# Patient Record
Sex: FEMALE | Race: BLACK OR AFRICAN AMERICAN | Employment: UNEMPLOYED | URBAN - METROPOLITAN AREA
[De-identification: names, ages, dates, MRNs, and addresses within clinical notes are randomized per-mention and may not be internally consistent; named-entity substitution may affect disease eponyms.]

---

## 2019-05-12 ENCOUNTER — HOSPITAL ENCOUNTER (EMERGENCY)
Age: 54
Discharge: HOME | End: 2019-05-12
Attending: EMERGENCY MEDICINE
Payer: MEDICARE

## 2019-05-12 ENCOUNTER — APPOINTMENT (OUTPATIENT)
Dept: GENERAL RADIOLOGY | Age: 54
End: 2019-05-12
Attending: EMERGENCY MEDICINE
Payer: MEDICARE

## 2019-05-12 VITALS
OXYGEN SATURATION: 93 % | TEMPERATURE: 98.5 F | BODY MASS INDEX: 37.77 KG/M2 | WEIGHT: 235 LBS | DIASTOLIC BLOOD PRESSURE: 74 MMHG | SYSTOLIC BLOOD PRESSURE: 112 MMHG | HEART RATE: 52 BPM | RESPIRATION RATE: 14 BRPM | HEIGHT: 66 IN

## 2019-05-12 DIAGNOSIS — N30.00 ACUTE CYSTITIS WITHOUT HEMATURIA: Primary | ICD-10-CM

## 2019-05-12 DIAGNOSIS — S29.012A STRAIN OF THORACIC BACK REGION: ICD-10-CM

## 2019-05-12 LAB
ALBUMIN SERPL-MCNC: 4.3 G/DL (ref 3.4–5)
ALBUMIN/GLOB SERPL: 1.1 {RATIO} (ref 0.8–1.7)
ALP SERPL-CCNC: 87 U/L (ref 45–117)
ALT SERPL-CCNC: 25 U/L (ref 13–56)
ANION GAP SERPL CALC-SCNC: 10 MMOL/L (ref 3–18)
APPEARANCE UR: CLEAR
AST SERPL-CCNC: 28 U/L (ref 15–37)
BACTERIA URNS QL MICRO: ABNORMAL /HPF
BASOPHILS # BLD: 0 K/UL (ref 0–0.1)
BASOPHILS NFR BLD: 0 % (ref 0–2)
BILIRUB SERPL-MCNC: 0.5 MG/DL (ref 0.2–1)
BILIRUB UR QL: NEGATIVE
BUN SERPL-MCNC: 12 MG/DL (ref 7–18)
BUN/CREAT SERPL: 15 (ref 12–20)
CALCIUM SERPL-MCNC: 8.9 MG/DL (ref 8.5–10.1)
CHLORIDE SERPL-SCNC: 101 MMOL/L (ref 100–108)
CK MB CFR SERPL CALC: 0.3 % (ref 0–4)
CK MB SERPL-MCNC: 2.3 NG/ML (ref 5–25)
CK SERPL-CCNC: 729 U/L (ref 26–192)
CO2 SERPL-SCNC: 27 MMOL/L (ref 21–32)
COLOR UR: YELLOW
CREAT SERPL-MCNC: 0.8 MG/DL (ref 0.6–1.3)
DIFFERENTIAL METHOD BLD: NORMAL
EOSINOPHIL # BLD: 0.4 K/UL (ref 0–0.4)
EOSINOPHIL NFR BLD: 4 % (ref 0–5)
EPITH CASTS URNS QL MICRO: ABNORMAL /LPF (ref 0–5)
ERYTHROCYTE [DISTWIDTH] IN BLOOD BY AUTOMATED COUNT: 13.8 % (ref 11.6–14.5)
GLOBULIN SER CALC-MCNC: 3.9 G/DL (ref 2–4)
GLUCOSE SERPL-MCNC: 84 MG/DL (ref 74–99)
GLUCOSE UR STRIP.AUTO-MCNC: NEGATIVE MG/DL
HCT VFR BLD AUTO: 39.2 % (ref 35–45)
HGB BLD-MCNC: 12.7 G/DL (ref 12–16)
HGB UR QL STRIP: ABNORMAL
KETONES UR QL STRIP.AUTO: NEGATIVE MG/DL
LEUKOCYTE ESTERASE UR QL STRIP.AUTO: ABNORMAL
LYMPHOCYTES # BLD: 3.2 K/UL (ref 0.9–3.6)
LYMPHOCYTES NFR BLD: 36 % (ref 21–52)
MAGNESIUM SERPL-MCNC: 2.1 MG/DL (ref 1.6–2.6)
MCH RBC QN AUTO: 26.3 PG (ref 24–34)
MCHC RBC AUTO-ENTMCNC: 32.4 G/DL (ref 31–37)
MCV RBC AUTO: 81.3 FL (ref 74–97)
MONOCYTES # BLD: 0.7 K/UL (ref 0.05–1.2)
MONOCYTES NFR BLD: 8 % (ref 3–10)
NEUTS SEG # BLD: 4.7 K/UL (ref 1.8–8)
NEUTS SEG NFR BLD: 52 % (ref 40–73)
NITRITE UR QL STRIP.AUTO: NEGATIVE
PH UR STRIP: 6.5 [PH] (ref 5–8)
PLATELET # BLD AUTO: 275 K/UL (ref 135–420)
PMV BLD AUTO: 10.1 FL (ref 9.2–11.8)
POTASSIUM SERPL-SCNC: 3.9 MMOL/L (ref 3.5–5.5)
PROT SERPL-MCNC: 8.2 G/DL (ref 6.4–8.2)
PROT UR STRIP-MCNC: NEGATIVE MG/DL
RBC # BLD AUTO: 4.82 M/UL (ref 4.2–5.3)
RBC #/AREA URNS HPF: ABNORMAL /HPF (ref 0–5)
SODIUM SERPL-SCNC: 138 MMOL/L (ref 136–145)
SP GR UR REFRACTOMETRY: 1.01 (ref 1–1.03)
TROPONIN I SERPL-MCNC: <0.02 NG/ML (ref 0–0.04)
UROBILINOGEN UR QL STRIP.AUTO: 1 EU/DL (ref 0.2–1)
WBC # BLD AUTO: 9 K/UL (ref 4.6–13.2)
WBC URNS QL MICRO: ABNORMAL /HPF (ref 0–5)

## 2019-05-12 PROCEDURE — 99285 EMERGENCY DEPT VISIT HI MDM: CPT

## 2019-05-12 PROCEDURE — 71045 X-RAY EXAM CHEST 1 VIEW: CPT

## 2019-05-12 PROCEDURE — 81001 URINALYSIS AUTO W/SCOPE: CPT

## 2019-05-12 PROCEDURE — 82550 ASSAY OF CK (CPK): CPT

## 2019-05-12 PROCEDURE — 85025 COMPLETE CBC W/AUTO DIFF WBC: CPT

## 2019-05-12 PROCEDURE — 83735 ASSAY OF MAGNESIUM: CPT

## 2019-05-12 PROCEDURE — 80053 COMPREHEN METABOLIC PANEL: CPT

## 2019-05-12 PROCEDURE — 93005 ELECTROCARDIOGRAM TRACING: CPT

## 2019-05-12 PROCEDURE — 74011250637 HC RX REV CODE- 250/637: Performed by: EMERGENCY MEDICINE

## 2019-05-12 RX ORDER — NAPROXEN SODIUM 220 MG
220 TABLET ORAL
COMMUNITY
Start: 2019-03-18

## 2019-05-12 RX ORDER — OMEPRAZOLE 10 MG/1
1 CAPSULE, DELAYED RELEASE ORAL
COMMUNITY
Start: 2018-06-08

## 2019-05-12 RX ORDER — CEPHALEXIN 500 MG/1
500 CAPSULE ORAL 2 TIMES DAILY
Qty: 14 CAP | Refills: 0 | Status: SHIPPED | OUTPATIENT
Start: 2019-05-12 | End: 2019-05-19

## 2019-05-12 RX ORDER — ATENOLOL 25 MG/1
25 TABLET ORAL
COMMUNITY
Start: 2013-08-29

## 2019-05-12 RX ORDER — METHOCARBAMOL 750 MG/1
750 TABLET, FILM COATED ORAL 4 TIMES DAILY
Qty: 24 TAB | Refills: 0 | Status: SHIPPED | OUTPATIENT
Start: 2019-05-12

## 2019-05-12 RX ORDER — METHIMAZOLE 5 MG/1
5 TABLET ORAL
COMMUNITY
Start: 2016-02-02

## 2019-05-12 RX ORDER — CHLORTHALIDONE 25 MG/1
25 TABLET ORAL
COMMUNITY
Start: 2019-03-18

## 2019-05-12 RX ORDER — GUAIFENESIN 100 MG/5ML
162 LIQUID (ML) ORAL
Status: COMPLETED | OUTPATIENT
Start: 2019-05-12 | End: 2019-05-12

## 2019-05-12 RX ADMIN — ASPIRIN 81 MG 162 MG: 81 TABLET ORAL at 10:59

## 2019-05-12 NOTE — ED NOTES
Pt discharged with daughters, no acute distress on discharge, written isnt given to pt and daughters with rx x 2 e faxed to pharmacy, verbalizes understanding Patient armband removed and shredded

## 2019-05-12 NOTE — ED NOTES
Pt resting on stretcher, call bell within reach, side rails up, daughter at bedside, no needs expressed at this time

## 2019-05-12 NOTE — ED TRIAGE NOTES
Patient with complaints of upper back and neck pain that started last night. Patient states that she started to have radiation to her chest PTA

## 2019-05-12 NOTE — DISCHARGE INSTRUCTIONS
Patient Education        Urinary Tract Infection in Women: Care Instructions  Your Care Instructions    A urinary tract infection, or UTI, is a general term for an infection anywhere between the kidneys and the urethra (where urine comes out). Most UTIs are bladder infections. They often cause pain or burning when you urinate. UTIs are caused by bacteria and can be cured with antibiotics. Be sure to complete your treatment so that the infection goes away. Follow-up care is a key part of your treatment and safety. Be sure to make and go to all appointments, and call your doctor if you are having problems. It's also a good idea to know your test results and keep a list of the medicines you take. How can you care for yourself at home? · Take your antibiotics as directed. Do not stop taking them just because you feel better. You need to take the full course of antibiotics. · Drink extra water and other fluids for the next day or two. This may help wash out the bacteria that are causing the infection. (If you have kidney, heart, or liver disease and have to limit fluids, talk with your doctor before you increase your fluid intake.)  · Avoid drinks that are carbonated or have caffeine. They can irritate the bladder. · Urinate often. Try to empty your bladder each time. · To relieve pain, take a hot bath or lay a heating pad set on low over your lower belly or genital area. Never go to sleep with a heating pad in place. To prevent UTIs  · Drink plenty of water each day. This helps you urinate often, which clears bacteria from your system. (If you have kidney, heart, or liver disease and have to limit fluids, talk with your doctor before you increase your fluid intake.)  · Urinate when you need to. · Urinate right after you have sex. · Change sanitary pads often. · Avoid douches, bubble baths, feminine hygiene sprays, and other feminine hygiene products that have deodorants.   · After going to the bathroom, wipe from front to back. When should you call for help? Call your doctor now or seek immediate medical care if:    · Symptoms such as fever, chills, nausea, or vomiting get worse or appear for the first time.     · You have new pain in your back just below your rib cage. This is called flank pain.     · There is new blood or pus in your urine.     · You have any problems with your antibiotic medicine.    Watch closely for changes in your health, and be sure to contact your doctor if:    · You are not getting better after taking an antibiotic for 2 days.     · Your symptoms go away but then come back. Where can you learn more? Go to http://denita-leigh.info/. Enter D070 in the search box to learn more about \"Urinary Tract Infection in Women: Care Instructions. \"  Current as of: March 20, 2018  Content Version: 11.9  © 3060-4748 [employer]. Care instructions adapted under license by [employer] (which disclaims liability or warranty for this information). If you have questions about a medical condition or this instruction, always ask your healthcare professional. [place] [address] any warranty or liability for your use of this information. Patient Education        Back Pain: Care Instructions  Your Care Instructions    Back pain has many possible causes. It is often related to problems with muscles and ligaments of the back. It may also be related to problems with the nerves, discs, or bones of the back. Moving, lifting, standing, sitting, or sleeping in an awkward way can strain the back. Sometimes you don't notice the injury until later. Arthritis is another common cause of back pain. Although it may hurt a lot, back pain usually improves on its own within several weeks. Most people recover in 12 weeks or less. Using good home treatment and being careful not to stress your back can help you feel better sooner.   Follow-up care is a key part of your treatment and safety. Be sure to make and go to all appointments, and call your doctor if you are having problems. It's also a good idea to know your test results and keep a list of the medicines you take. How can you care for yourself at home? · Sit or lie in positions that are most comfortable and reduce your pain. Try one of these positions when you lie down:  ? Lie on your back with your knees bent and supported by large pillows. ? Lie on the floor with your legs on the seat of a sofa or chair. ? Lie on your side with your knees and hips bent and a pillow between your legs. ? Lie on your stomach if it does not make pain worse. · Do not sit up in bed, and avoid soft couches and twisted positions. Bed rest can help relieve pain at first, but it delays healing. Avoid bed rest after the first day of back pain. · Change positions every 30 minutes. If you must sit for long periods of time, take breaks from sitting. Get up and walk around, or lie in a comfortable position. · Try using a heating pad on a low or medium setting for 15 to 20 minutes every 2 or 3 hours. Try a warm shower in place of one session with the heating pad. · You can also try an ice pack for 10 to 15 minutes every 2 to 3 hours. Put a thin cloth between the ice pack and your skin. · Take pain medicines exactly as directed. ? If the doctor gave you a prescription medicine for pain, take it as prescribed. ? If you are not taking a prescription pain medicine, ask your doctor if you can take an over-the-counter medicine. · Take short walks several times a day. You can start with 5 to 10 minutes, 3 or 4 times a day, and work up to longer walks. Walk on level surfaces and avoid hills and stairs until your back is better. · Return to work and other activities as soon as you can. Continued rest without activity is usually not good for your back. · To prevent future back pain, do exercises to stretch and strengthen your back and stomach.  Learn how to use good posture, safe lifting techniques, and proper body mechanics. When should you call for help? Call your doctor now or seek immediate medical care if:    · You have new or worsening numbness in your legs.     · You have new or worsening weakness in your legs. (This could make it hard to stand up.)     · You lose control of your bladder or bowels.    Watch closely for changes in your health, and be sure to contact your doctor if:    · You have a fever, lose weight, or don't feel well.     · You do not get better as expected. Where can you learn more? Go to http://denita-leigh.info/. Enter V498 in the search box to learn more about \"Back Pain: Care Instructions. \"  Current as of: September 20, 2018  Content Version: 11.9  © 1972-5959 [employer]. Care instructions adapted under license by [employer] (which disclaims liability or warranty for this information). If you have questions about a medical condition or this instruction, always ask your healthcare professional. [address] any warranty or liability for your use of this information.

## 2019-05-12 NOTE — ED NOTES
Pt resting on stretcher, call bell within reach, side rails up, no needs expressed at this time, daughters remain at bedside

## 2019-05-12 NOTE — ED PROVIDER NOTES
EMERGENCY DEPARTMENT HISTORY AND PHYSICAL EXAM 
 
Date: 5/12/2019 Patient Name: Ivette Ervin History of Presenting Illness Chief Complaint Patient presents with  Back Pain  Neck Pain History Provided By: Patient History Virgilio Pallas):  
10:03 AM 
Ivette Ervin is a 53 y.o. female with PMHX of Hypertension, seizures, blindness who presents to the emergency department C/O upper back and neck pain onset last night. Patient states that she has had upper back and neck pain since last night this morning she began to experience some radiation to her chest prior to arrival.  Patient states she had been cleaning up at home last night. She denies upper back pain until she sat down to rest afterwards. Associated sxs include mild swelling at the base of the cervical spine. Pt denies shortness of breath, fever, chills, nausea, vomiting or any other sxs or complaints. Chief Complaint: Thoracic back pain Duration: One day Timing: Acute onset last night persistent today Location: Upper thoracic spine Quality: Aching Severity: Moderate Modifying Factors: Nothing makes it better, touching it makes it worse. Associated Symptoms: denies any other associated signs or symptoms PCP: Nato Cesar MD 
  
Current Outpatient Medications Medication Sig Dispense Refill  atenolol (TENORMIN) 25 mg tablet Take 25 mg by mouth.  chlorthalidone (HYGROTEN) 25 mg tablet Take 25 mg by mouth.  methIMAzole (TAPAZOLE) 5 mg tablet Take 5 mg by mouth.  omeprazole (PRILOSEC) 10 mg capsule Take 1 Cap by mouth.  naproxen sodium (NAPROSYN) 220 mg tablet Take 220 mg by mouth.  methocarbamol (ROBAXIN-750) 750 mg tablet Take 1 Tab by mouth four (4) times daily. 24 Tab 0  cephALEXin (KEFLEX) 500 mg capsule Take 1 Cap by mouth two (2) times a day for 7 days. 14 Cap 0  
 divalproex DR (DEPAKOTE) 125 mg tablet Take 500 mg by mouth three (3) times daily. Indications: EPILEPSY  traMADol (ULTRAM) 50 mg tablet Take 1 Tab by mouth every six (6) hours as needed for Pain. Max Daily Amount: 200 mg. 20 Tab 0 Past History Past Medical History: 
Past Medical History:  
Diagnosis Date  Blind  Hypertension  Seizures ([address] Past Surgical History: 
Past Surgical History:  
Procedure Laterality Date  HX DILATION AND CURETTAGE    
 HX GYN    
 kidney stone removal  
 HX HEENT    
 tonsils removed resulted in tracheostomy Family History: 
History reviewed. No pertinent family history. Social History: 
Social History Tobacco Use  Smoking status: Never Smoker  Smokeless tobacco: Never Used Substance Use Topics  Alcohol use: No  
 Drug use: Not on file Allergies: 
No Known Allergies Review of Systems Review of Systems Constitutional: Negative for chills and fever. Respiratory: Negative for shortness of breath. Cardiovascular: Positive for chest pain. Gastrointestinal: Negative for nausea and vomiting. Musculoskeletal: Positive for back pain. Physical Exam  
 
Vitals:  
 05/12/19 1030 05/12/19 1100 05/12/19 1130 05/12/19 1200 BP: 129/88 115/74 126/81 113/74 Pulse: 65 (!) 55 (!) 52 (!) 56 Resp: 16 15 14 18 Temp:      
SpO2:  100% 100% 100% Weight:      
Height:      
 
Physical Exam  
Nursing note and vitals reviewed. Vital signs and nursing notes reviewed CONSTITUTIONAL: Alert, in no apparent distress; well-developed; well-nourished. HEAD:  Normocephalic, atraumatic EYES: Baseline disconjugate gaze. ENTM: Nose: no rhinorrhea; Throat: no erythema or exudate, mucous membranes moist 
Neck:  No JVD, supple without lymphadenopathy RESP: Chest clear, equal breath sounds. CV: S1 and S2 WNL; No murmurs, gallops or rubs. GI: Normal bowel sounds, abdomen soft and non-tender. No masses or organomegaly. : No costo-vertebral angle tenderness. BACK: Mild swelling over T1, tenderness and spasm in the upper thoracic paraspinous muscles bilaterally UPPER EXT:  Normal inspection LOWER EXT: No edema, no calf tenderness. Distal pulses intact. NEURO: CN intact, reflexes 2/4 and sym, strength 5/5 and sym, sensation intact. SKIN: No rashes; Normal for age and stage. PSYCH:  Alert and oriented, normal affect. Diagnostic Study Results Labs - Recent Results (from the past 12 hour(s)) EKG, 12 LEAD, INITIAL Collection Time: 05/12/19  9:57 AM  
Result Value Ref Range Ventricular Rate 51 BPM  
 Atrial Rate 51 BPM  
 P-R Interval 154 ms QRS Duration 86 ms  
 Q-T Interval 502 ms QTC Calculation (Bezet) 462 ms Calculated P Axis 36 degrees Calculated R Axis 25 degrees Calculated T Axis 39 degrees Diagnosis Sinus bradycardia Otherwise normal ECG No previous ECGs available CBC WITH AUTOMATED DIFF Collection Time: 05/12/19 10:30 AM  
Result Value Ref Range WBC 9.0 4.6 - 13.2 K/uL  
 RBC 4.82 4.20 - 5.30 M/uL  
 HGB 12.7 12.0 - 16.0 g/dL HCT 39.2 35.0 - 45.0 % MCV 81.3 74.0 - 97.0 FL  
 MCH 26.3 24.0 - 34.0 PG  
 MCHC 32.4 31.0 - 37.0 g/dL  
 RDW 13.8 11.6 - 14.5 % PLATELET 751 822 - 996 K/uL MPV 10.1 9.2 - 11.8 FL  
 NEUTROPHILS 52 40 - 73 % LYMPHOCYTES 36 21 - 52 % MONOCYTES 8 3 - 10 % EOSINOPHILS 4 0 - 5 % BASOPHILS 0 0 - 2 %  
 ABS. NEUTROPHILS 4.7 1.8 - 8.0 K/UL  
 ABS. LYMPHOCYTES 3.2 0.9 - 3.6 K/UL  
 ABS. MONOCYTES 0.7 0.05 - 1.2 K/UL  
 ABS. EOSINOPHILS 0.4 0.0 - 0.4 K/UL  
 ABS. BASOPHILS 0.0 0.0 - 0.1 K/UL  
 DF AUTOMATED METABOLIC PANEL, COMPREHENSIVE Collection Time: 05/12/19 10:30 AM  
Result Value Ref Range Sodium 138 136 - 145 mmol/L Potassium 3.9 3.5 - 5.5 mmol/L Chloride 101 100 - 108 mmol/L  
 CO2 27 21 - 32 mmol/L Anion gap 10 3.0 - 18 mmol/L Glucose 84 74 - 99 mg/dL BUN 12 7.0 - 18 MG/DL  Creatinine 0.80 0.6 - 1.3 MG/DL  
 BUN/Creatinine ratio 15 12 - 20 GFR est AA >60 >60 ml/min/1.73m2 GFR est non-AA >60 >60 ml/min/1.73m2 Calcium 8.9 8.5 - 10.1 MG/DL Bilirubin, total 0.5 0.2 - 1.0 MG/DL  
 ALT (SGPT) 25 13 - 56 U/L  
 AST (SGOT) 28 15 - 37 U/L Alk. phosphatase 87 45 - 117 U/L Protein, total 8.2 6.4 - 8.2 g/dL Albumin 4.3 3.4 - 5.0 g/dL Globulin 3.9 2.0 - 4.0 g/dL A-G Ratio 1.1 0.8 - 1.7 MAGNESIUM Collection Time: 05/12/19 10:30 AM  
Result Value Ref Range Magnesium 2.1 1.6 - 2.6 mg/dL CARDIAC PANEL,(CK, CKMB & TROPONIN) Collection Time: 05/12/19 10:30 AM  
Result Value Ref Range  (H) 26 - 192 U/L  
 CK - MB 2.3 <3.6 ng/ml CK-MB Index 0.3 0.0 - 4.0 % Troponin-I, QT <0.02 0.0 - 0.045 NG/ML  
URINALYSIS W/ RFLX MICROSCOPIC Collection Time: 05/12/19 12:00 PM  
Result Value Ref Range Color YELLOW Appearance CLEAR Specific gravity 1.008 1.005 - 1.030    
 pH (UA) 6.5 5.0 - 8.0 Protein NEGATIVE  NEG mg/dL Glucose NEGATIVE  NEG mg/dL Ketone NEGATIVE  NEG mg/dL Bilirubin NEGATIVE  NEG Blood TRACE (A) NEG Urobilinogen 1.0 0.2 - 1.0 EU/dL Nitrites NEGATIVE  NEG Leukocyte Esterase TRACE (A) NEG URINE MICROSCOPIC ONLY Collection Time: 05/12/19 12:00 PM  
Result Value Ref Range WBC 0 to 3 0 - 5 /hpf  
 RBC NONE 0 - 5 /hpf Epithelial cells 2+ 0 - 5 /lpf Bacteria 4+ (A) NEG /hpf Radiologic Studies -  
11:53 AM 
RADIOLOGY FINDINGS Chest X-ray shows no acute cardiopulmonary process. Pending review by Radiologist 
Recorded by Lucy Gallegos MD. 
 
XR CHEST PORT Final Result IMPRESSION:  
  
1. Stable exam. No acute cardiopulmonary process. CT Results  (Last 48 hours) None CXR Results  (Last 48 hours) 05/12/19 1055  XR CHEST PORT Final result Impression:  IMPRESSION:  
   
1. Stable exam. No acute cardiopulmonary process.    
  
 Narrative:  EXAM: XR CHEST PORT  
   
 CLINICAL INDICATION/HISTORY: thoracic back pain/chest pain  
-Additional: None COMPARISON: 11/12/2015 TECHNIQUE: Frontal view of the chest  
   
_______________ FINDINGS:  
   
HEART AND MEDIASTINUM: Midline cardiac silhouette, normal in size. Unremarkable  
hilar vascular structures. LUNGS AND PLEURAL SPACES: No focal consolidation, parenchymal opacity. No  
pneumothorax or pleural effusion. BONY THORAX AND SOFT TISSUES: No acute or destructive osseous abnormality. _______________ Medications given in the ED- Medications  
aspirin chewable tablet 162 mg (162 mg Oral Given 5/12/19 1059) Medical Decision Making I am the first provider for this patient. I reviewed the vital signs, available nursing notes, past medical history, past surgical history, family history and social history. Vital Signs-Reviewed the patient's vital signs. EKG interpretation: (Preliminary) Rhythm: Sinus bradycardia. Rate: 51 bpm; no STEMI 
EKG read by Bartolo Armando MD at 9:57 AM 
 
Records Reviewed: Nursing Notes Provider Notes (Medical Decision Making): DDX: Sprain, strain, fracture, subluxation, unlikely ACS, unlikely aortic pathology Procedures: 
Procedures ED Course:  
10:03 AM Initial assessment performed. The patients presenting problems have been discussed, and they are in agreement with the care plan formulated and outlined with them. I have encouraged them to ask questions as they arise throughout their visit. Diagnosis and Disposition Discussion: 
53 y.o. female with thoracic back pain which is very consistent with a musculoskeletal injury as pain is completely and exactly reproducible on exam.  Cardiac panel was negative. Chest x-ray was negative. Patient has incidental positive UA for UTI. Will cover with Keflex. Patient to follow-up with primary care doctor. Patient agrees with plan.  
 
DISCHARGE NOTE: 
1:27 PM  
 Ofelia Webb's  results have been reviewed with her. She has been counseled regarding her diagnosis, treatment, and plan. She verbally conveys understanding and agreement of the signs, symptoms, diagnosis, treatment and prognosis and additionally agrees to follow up as discussed. She also agrees with the care-plan and conveys that all of her questions have been answered. I have also provided discharge instructions for her that include: educational information regarding their diagnosis and treatment, and list of reasons why they would want to return to the ED prior to their follow-up appointment, should her condition change. She has been provided with education for proper emergency department utilization. CLINICAL IMPRESSION: 
 
1. Acute cystitis without hematuria 2. Strain of thoracic back region PLAN: 
1. D/C Home 2. Current Discharge Medication List  
  
START taking these medications Details  
methocarbamol (ROBAXIN-750) 750 mg tablet Take 1 Tab by mouth four (4) times daily. Qty: 24 Tab, Refills: 0  
  
cephALEXin (KEFLEX) 500 mg capsule Take 1 Cap by mouth two (2) times a day for 7 days. Qty: 14 Cap, Refills: 0 CONTINUE these medications which have NOT CHANGED Details  
atenolol (TENORMIN) 25 mg tablet Take 25 mg by mouth. chlorthalidone (HYGROTEN) 25 mg tablet Take 25 mg by mouth. methIMAzole (TAPAZOLE) 5 mg tablet Take 5 mg by mouth. omeprazole (PRILOSEC) 10 mg capsule Take 1 Cap by mouth. naproxen sodium (NAPROSYN) 220 mg tablet Take 220 mg by mouth. divalproex DR (DEPAKOTE) 125 mg tablet Take 500 mg by mouth three (3) times daily. Indications: EPILEPSY  
  
traMADol (ULTRAM) 50 mg tablet Take 1 Tab by mouth every six (6) hours as needed for Pain. Max Daily Amount: 200 mg. Qty: 20 Tab, Refills: 0  
  
  
 
3. Follow-up Information Follow up With Specialties Details Why Contact Info Tariq Wen,  [facility]   [address] [place] [address] 
[phone] [facility] EMERGENCY DEPT Emergency Medicine  As needed; If symptoms worsen [address] [place] [address] [phone] Please note that this dictation was completed with [email or web address], the computer voice recognition software. Quite often unanticipated grammatical, syntax, homophones, and other interpretive errors are inadvertently transcribed by the computer software. Please disregard these errors. Please excuse any errors that have escaped final proofreading.  
 
Chioma Montgomery MD

## 2019-05-13 LAB
ATRIAL RATE: 51 BPM
CALCULATED P AXIS, ECG09: 36 DEGREES
CALCULATED R AXIS, ECG10: 25 DEGREES
CALCULATED T AXIS, ECG11: 39 DEGREES
DIAGNOSIS, 93000: NORMAL
P-R INTERVAL, ECG05: 154 MS
Q-T INTERVAL, ECG07: 502 MS
QRS DURATION, ECG06: 86 MS
QTC CALCULATION (BEZET), ECG08: 462 MS
VENTRICULAR RATE, ECG03: 51 BPM

## 2021-07-04 ENCOUNTER — HOSPITAL ENCOUNTER (EMERGENCY)
Age: 56
Discharge: HOME | End: 2021-07-05
Attending: EMERGENCY MEDICINE
Payer: MEDICARE

## 2021-07-04 ENCOUNTER — APPOINTMENT (OUTPATIENT)
Dept: CT IMAGING | Age: 56
End: 2021-07-04
Attending: EMERGENCY MEDICINE
Payer: MEDICARE

## 2021-07-04 VITALS
BODY MASS INDEX: 38.32 KG/M2 | WEIGHT: 230 LBS | OXYGEN SATURATION: 100 % | HEART RATE: 96 BPM | HEIGHT: 65 IN | DIASTOLIC BLOOD PRESSURE: 97 MMHG | RESPIRATION RATE: 18 BRPM | SYSTOLIC BLOOD PRESSURE: 157 MMHG | TEMPERATURE: 98 F

## 2021-07-04 DIAGNOSIS — S00.93XA CONTUSION OF HEAD, UNSPECIFIED PART OF HEAD, INITIAL ENCOUNTER: Primary | ICD-10-CM

## 2021-07-04 PROCEDURE — 99284 EMERGENCY DEPT VISIT MOD MDM: CPT

## 2021-07-04 PROCEDURE — 70450 CT HEAD/BRAIN W/O DYE: CPT

## 2021-07-04 PROCEDURE — 74011250637 HC RX REV CODE- 250/637: Performed by: EMERGENCY MEDICINE

## 2021-07-04 RX ORDER — KETOROLAC TROMETHAMINE 10 MG/1
10 TABLET, FILM COATED ORAL
Qty: 20 TABLET | Refills: 0 | Status: SHIPPED | OUTPATIENT
Start: 2021-07-04 | End: 2021-07-09

## 2021-07-04 RX ORDER — KETOROLAC TROMETHAMINE 10 MG/1
10 TABLET, FILM COATED ORAL
Qty: 20 TABLET | Refills: 0 | Status: SHIPPED | OUTPATIENT
Start: 2021-07-04 | End: 2021-07-04 | Stop reason: SDUPTHER

## 2021-07-04 RX ORDER — ACETAMINOPHEN 500 MG
1000 TABLET ORAL ONCE
Status: COMPLETED | OUTPATIENT
Start: 2021-07-04 | End: 2021-07-04

## 2021-07-04 RX ORDER — IBUPROFEN 400 MG/1
800 TABLET ORAL ONCE
Status: COMPLETED | OUTPATIENT
Start: 2021-07-04 | End: 2021-07-04

## 2021-07-04 RX ADMIN — ACETAMINOPHEN 1000 MG: 500 TABLET ORAL at 22:13

## 2021-07-04 RX ADMIN — IBUPROFEN 800 MG: 400 TABLET ORAL at 22:13

## 2021-07-05 NOTE — ED NOTES
Pt reports to ER w/ EMS due to fall to posterior side of head w/ no LOC occurring PTA. Pt legally blind and reports going to sit down on the toilet when she fell backwards into tub; hitting back side of head. Bump noted to posterior side of head and pt reports increasing headache. No laceration noted to head. Alert and oriented x4. Skin warm dry and intact. Ambulates w/ walker but can ambulate independently w/ assistance. Emergency Department Nursing Plan of Care       The Nursing Plan of Care is developed from the Nursing assessment and Emergency Department Attending provider initial evaluation. The plan of care may be reviewed in the ED Provider note.     The Plan of Care was developed with the following considerations:   Patient / Family readiness to learn indicated by:verbalized understanding  Persons(s) to be included in education: patient  Barriers to Learning/Limitations:No    Signed     Nancy Pepper    7/4/2021   10:29 PM

## 2021-07-05 NOTE — ED NOTES
Patient given copy of dc instructions and 1 script(s). Patient  verbalized understanding of instructions and script (s). Patient given a current medication reconciliation form and verbalized understanding of their medications. Patient  verbalized understanding of the importance of discussing medications with  his or her physician or clinic they will be following up with. Patient alert and oriented and in no acute distress. Patient discharged home with walking stick via cab ride.

## 2021-07-05 NOTE — ED PROVIDER NOTES
EMERGENCY DEPARTMENT HISTORY AND PHYSICAL EXAM      Date: 7/4/2021  Patient Name: Vincent Parker    History of Presenting Illness     Chief Complaint   Patient presents with    Fall       History Provided By: Patient    HPI: Vincent Parker, 61 y.o. female with past medical history of hypertension, legally blind presenting today after a fall. The patient says that she is visiting friends here in [place] and is actually from [facility]. She says that today she was going to the bathroom and she thought she knew the layout of the bathroom but she tripped over the bathtub and fell into the bathtub striking the back of her head. She denies loss of consciousness. The patient is not on any anticoagulation. The patient is complaining of head pain in the posterior aspect of the head as well as in the left eye area. She has not taken any medication for her symptoms. No other complaints at this time. There are no other complaints, changes, or physical findings at this time. PCP: Other, MD Nato    No current facility-administered medications on file prior to encounter. Current Outpatient Medications on File Prior to Encounter   Medication Sig Dispense Refill    atenolol (TENORMIN) 25 mg tablet Take 25 mg by mouth. chlorthalidone (HYGROTEN) 25 mg tablet Take 25 mg by mouth. methIMAzole (TAPAZOLE) 5 mg tablet Take 5 mg by mouth. divalproex DR (DEPAKOTE) 125 mg tablet Take 500 mg by mouth three (3) times daily. Indications: EPILEPSY      omeprazole (PRILOSEC) 10 mg capsule Take 1 Cap by mouth. (Patient not taking: Reported on 7/4/2021)      naproxen sodium (NAPROSYN) 220 mg tablet Take 220 mg by mouth. (Patient not taking: Reported on 7/4/2021)      methocarbamol (ROBAXIN-750) 750 mg tablet Take 1 Tab by mouth four (4) times daily. (Patient not taking: Reported on 7/4/2021) 24 Tab 0    traMADol (ULTRAM) 50 mg tablet Take 1 Tab by mouth every six (6) hours as needed for Pain.  Max Daily Amount: 200 mg. (Patient not taking: Reported on 7/4/2021) 20 Tab 0       Past History     Past Medical History:  Past Medical History:   Diagnosis Date    Blind     Hypertension     Seizures ([address]        Past Surgical History:  Past Surgical History:   Procedure Laterality Date    HX DILATION AND CURETTAGE      HX GYN      kidney stone removal    HX HEENT      tonsils removed resulted in tracheostomy       Family History:  History reviewed. No pertinent family history. Social History:  Social History     Tobacco Use    Smoking status: Never Smoker    Smokeless tobacco: Never Used   Substance Use Topics    Alcohol use: No    Drug use: Not on file       Allergies:  No Known Allergies      Review of Systems   Constitutional: No  fever  Skin: No  rash  HEENT: No  nasal congestion  Resp: No cough  CV: No chest pain  GI: No vomiting  : No dysuria  MSK: No joint pain  Neuro: No numbness  Psych: No anxiety      Physical Exam     Patient Vitals for the past 12 hrs:   Temp Pulse Resp BP SpO2   07/04/21 2200 98 °F (36.7 °C) 96 18 (!) 157/97 100 %     General: alert, No acute distress  Eyes: EOMI, normal conjunctiva  ENT: moist mucous membranes. Head: Contusion to the posterior aspect of the head with tenderness to palpation, no skin lesion  Neck: Active, full ROM of neck. Skin: No rashes. no jaundice              Lungs: Equal chest expansion. no respiratory distress. clear to auscultation bilaterally No accessory muscle usage  Heart: regular rate     no peripheral edema   2+ radial pulses and DPs bilaterally  Abd:  non distended soft, nontender. No rebound tenderness. No guarding  Back: Full ROM  MSK: Full, active ROM in all 4 extremities. Neuro: Alert and oriented to Person, Place, Time, and Situation; normal speech;   Psych: Cooperative with exam; Appropriate mood and affect             Diagnostic Study Results     Labs -   No results found for this or any previous visit (from the past 12 hour(s)).     Radiologic Studies -   CT HEAD WO CONT   Final Result      No acute traumatic injury. CT Results  (Last 48 hours)                 07/04/21 2253  CT HEAD WO CONT Final result    Impression:      No acute traumatic injury. Narrative:  INDICATION:   Fall       EXAMINATION:  CT HEAD WO CONTRAST       COMPARISON:  None       TECHNIQUE:  Routine noncontrast axial head CT was performed. Sagittal and   coronal reconstructions were generated. CT dose reduction was achieved through   use of a standardized protocol tailored for this examination and automatic   exposure control for dose modulation. FINDINGS:       Ventricles:  Midline, no hydrocephalus. Intracranial Hemorrhage:  None. Brain Parenchyma/Brainstem:  Normal for age. Basal Cisterns:  Normal.    Paranasal Sinuses:  Visualized sinuses are clear. Soft Tissues:  No significant soft tissue swelling. Osseous Structures:  No acute fracture. Additional Comments:  N/A. CXR Results  (Last 48 hours)      None            Medical Decision Making   I am the first provider for this patient. I reviewed the vital signs, available nursing notes, past medical history, past surgical history, family history and social history. Vital Signs-Reviewed the patient's vital signs. Patient Vitals for the past 12 hrs:   Temp Pulse Resp BP SpO2   07/04/21 2200 98 °F (36.7 °C) 96 18 (!) 157/97 100 %       Provider Notes (Medical Decision Making):     Differential Diagnosis: Contusion, headache, minor concussion, subdural,    Initial Plan: We will treat the patient with oral analgesics, obtain CT imaging of the head and reassess after this. The patient is in no acute distress, no obvious skin lesion, she does have contusion to the head and probable minor concussion symptoms. ED Course:   Initial assessment performed.  The patients presenting problems have been discussed, and they are in agreement with the care plan formulated and outlined with them.  I have encouraged them to ask questions as they arise throughout their visit. ED Course as of Jul 04 2343   Kim Moya Jul 04, 2021   5660 Rotation of the patient CT at there is no evidence of acute abnormality. [NW]      ED Course User Index  [NW] Shu Dao MD       I, Jeff Bass MD, am the attending of record for this patient encounter. Dispo: Discharged. The patient has been re-evaluated and is ready for discharge. Reviewed available results with patient. Counseled patient on diagnosis and care plan. Patient has expressed understanding, and all questions have been answered. Patient agrees with plan and agrees to follow up as recommended, or to return to the ED if their symptoms worsen. Discharge instructions have been provided and explained to the patient, along with reasons to return to the ED. PLAN:  Current Discharge Medication List        START taking these medications    Details   ketorolac (TORADOL) 10 mg tablet Take 1 Tablet by mouth every six (6) hours as needed for Pain for up to 5 days. Qty: 20 Tablet, Refills: 0  Start date: 7/4/2021, End date: 7/9/2021           2. Follow-up Information       Follow up With Specialties Details Why Contact Info    PCP  Schedule an appointment as soon as possible for a visit             3.   Return to ED if worse       Diagnosis     Clinical Impression:   1. Contusion of head, unspecified part of head, initial encounter        Attestations:    Jeff Bass MD    Please note that this dictation was completed with [employer], the computer voice recognition software. Quite often unanticipated grammatical, syntax, homophones, and other interpretive errors are inadvertently transcribed by the computer software. Please disregard these errors. Please excuse any errors that have escaped final proofreading. Thank you.

## 2023-05-20 RX ORDER — TRAMADOL HYDROCHLORIDE 50 MG/1
50 TABLET ORAL EVERY 6 HOURS PRN
COMMUNITY
Start: 2015-11-12

## 2023-05-20 RX ORDER — DIVALPROEX SODIUM 125 MG/1
500 TABLET, DELAYED RELEASE ORAL 3 TIMES DAILY
COMMUNITY

## 2023-05-20 RX ORDER — NAPROXEN SODIUM 220 MG
220 TABLET ORAL
COMMUNITY
Start: 2019-03-18

## 2023-05-20 RX ORDER — METHIMAZOLE 5 MG/1
5 TABLET ORAL
COMMUNITY
Start: 2016-02-02

## 2023-05-20 RX ORDER — ATENOLOL 25 MG/1
25 TABLET ORAL
COMMUNITY
Start: 2013-08-29

## 2023-05-20 RX ORDER — CHLORTHALIDONE 25 MG/1
25 TABLET ORAL
COMMUNITY
Start: 2019-03-18

## 2023-05-20 RX ORDER — METHOCARBAMOL 750 MG/1
750 TABLET, FILM COATED ORAL 4 TIMES DAILY
COMMUNITY
Start: 2019-05-12

## 2023-05-20 RX ORDER — OMEPRAZOLE 10 MG/1
1 CAPSULE, DELAYED RELEASE ORAL
COMMUNITY
Start: 2018-06-08